# Patient Record
Sex: FEMALE | Race: WHITE | NOT HISPANIC OR LATINO | URBAN - METROPOLITAN AREA
[De-identification: names, ages, dates, MRNs, and addresses within clinical notes are randomized per-mention and may not be internally consistent; named-entity substitution may affect disease eponyms.]

---

## 2017-01-27 ENCOUNTER — OFFICE (OUTPATIENT)
Dept: URBAN - METROPOLITAN AREA CLINIC 70 | Facility: CLINIC | Age: 82
End: 2017-01-27

## 2017-01-27 VITALS
DIASTOLIC BLOOD PRESSURE: 70 MMHG | HEART RATE: 52 BPM | HEIGHT: 66 IN | WEIGHT: 148 LBS | SYSTOLIC BLOOD PRESSURE: 118 MMHG

## 2017-01-27 DIAGNOSIS — Z83.71 FAMILY HISTORY OF COLONIC POLYPS: ICD-10-CM

## 2017-01-27 DIAGNOSIS — R19.5 OTHER FECAL ABNORMALITIES: ICD-10-CM

## 2017-01-27 DIAGNOSIS — D64.9 ANEMIA, UNSPECIFIED: ICD-10-CM

## 2017-01-27 DIAGNOSIS — K59.03 DRUG INDUCED CONSTIPATION: ICD-10-CM

## 2017-01-27 DIAGNOSIS — K21.9 GASTRO-ESOPHAGEAL REFLUX DISEASE WITHOUT ESOPHAGITIS: ICD-10-CM

## 2017-01-27 DIAGNOSIS — K92.1 MELENA: ICD-10-CM

## 2017-01-27 PROCEDURE — 99203 OFFICE O/P NEW LOW 30 MIN: CPT | Performed by: INTERNAL MEDICINE

## 2017-01-27 NOTE — SERVICEHPINOTES
Thank you very much for allowing me to evaluate Ms. Fuentes. As you know she is a pleasant 83-year-old white female with a history of hypertension and resultant end-stage renal disease. She is now on dialysis. She is here because she says before Javier she went to the bathroom and noted blood. She says she got up and noted some blood on her sheet and then when she went to the bathroom and noticed some blood in the stool. She passed blood several times and also noted blood in the water. She does have intermittent issues with constipation probably related to her medications. She was told to start taking a probiotic. She also uses MiraLax when necessary and she says she has not seen any blood in about 3 weeks. There is no abdominal pain. She denies weight loss. She has never had a colonoscopy. She tells her daughter had a colonoscopy and had polyps at age 45. She was diagnosed with hemorrhoids, she says she was given a prescription for suppository but it was too expensive so she never got it filled.From an upper GI standpoint she has situational reflux. She says that this depends on what she eats but there is no chronic reflux. There is no dysphagia, odynophagia and nausea or vomiting. She does not smoke or drink. She is in no distress. She does not look acutely ill.